# Patient Record
Sex: MALE | Race: AMERICAN INDIAN OR ALASKA NATIVE
[De-identification: names, ages, dates, MRNs, and addresses within clinical notes are randomized per-mention and may not be internally consistent; named-entity substitution may affect disease eponyms.]

---

## 2017-03-01 ENCOUNTER — HOSPITAL ENCOUNTER (EMERGENCY)
Dept: HOSPITAL 5 - ED | Age: 67
LOS: 1 days | Discharge: HOME | End: 2017-03-02
Payer: COMMERCIAL

## 2017-03-01 VITALS — SYSTOLIC BLOOD PRESSURE: 129 MMHG | DIASTOLIC BLOOD PRESSURE: 80 MMHG

## 2017-03-01 DIAGNOSIS — I25.2: ICD-10-CM
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
## 2017-03-01 DIAGNOSIS — I10: ICD-10-CM
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
## 2017-03-01 DIAGNOSIS — Z88.6: ICD-10-CM
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
## 2017-03-01 DIAGNOSIS — S39.012A: Primary | ICD-10-CM
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
## 2017-03-01 DIAGNOSIS — I50.9: ICD-10-CM

## 2017-03-01 DIAGNOSIS — M25.511: ICD-10-CM

## 2017-03-01 PROCEDURE — 72100 X-RAY EXAM L-S SPINE 2/3 VWS: CPT
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
## 2017-03-02 NOTE — XRAY REPORT
LUMBOSACRAL SPINE, 3 VIEWS:



History: Back pain



Findings: Normal bone mineralization. Normal height and alignment of 

the vertebral bodies. No compression deformity or bone lesion. There is 

mild to moderate diffuse facet arthropathy which is most pronounced at 

L4-5 and L5-S1. There is moderate disc space narrowing at L5-S1. The 

remaining disc levels are within normal limits.



Impression:

Lumbar spondylosis as described. No acute process noted.

## 2017-10-25 ENCOUNTER — HOSPITAL ENCOUNTER (EMERGENCY)
Dept: HOSPITAL 5 - ED | Age: 67
End: 2017-10-25
Payer: MEDICARE

## 2017-10-25 DIAGNOSIS — M79.606: Primary | ICD-10-CM

## 2017-10-25 DIAGNOSIS — Z53.21: ICD-10-CM

## 2018-02-27 ENCOUNTER — HOSPITAL ENCOUNTER (EMERGENCY)
Dept: HOSPITAL 5 - ED | Age: 68
LOS: 1 days | Discharge: LEFT BEFORE DISCHARGE | End: 2018-02-28
Payer: MEDICARE

## 2018-02-27 VITALS — SYSTOLIC BLOOD PRESSURE: 110 MMHG | DIASTOLIC BLOOD PRESSURE: 69 MMHG

## 2018-02-27 DIAGNOSIS — R06.02: ICD-10-CM

## 2018-02-27 DIAGNOSIS — M54.9: Primary | ICD-10-CM

## 2018-02-27 DIAGNOSIS — Z53.21: ICD-10-CM

## 2018-02-27 LAB
ALBUMIN SERPL-MCNC: 3.9 G/DL (ref 3.9–5)
ALT SERPL-CCNC: 30 UNITS/L (ref 7–56)
BASOPHILS # (AUTO): 0 K/MM3 (ref 0–0.1)
BASOPHILS NFR BLD AUTO: 0.8 % (ref 0–1.8)
BUN SERPL-MCNC: 17 MG/DL (ref 9–20)
BUN/CREAT SERPL: 15 %
CALCIUM SERPL-MCNC: 9.4 MG/DL (ref 8.4–10.2)
EOSINOPHIL # BLD AUTO: 0.1 K/MM3 (ref 0–0.4)
EOSINOPHIL NFR BLD AUTO: 2.7 % (ref 0–4.3)
HCT VFR BLD CALC: 38.1 % (ref 35.5–45.6)
HEMOLYSIS INDEX: 4
HGB BLD-MCNC: 12.8 GM/DL (ref 11.8–15.2)
LYMPHOCYTES # BLD AUTO: 2.5 K/MM3 (ref 1.2–5.4)
LYMPHOCYTES NFR BLD AUTO: 45.1 % (ref 13.4–35)
MCH RBC QN AUTO: 32 PG (ref 28–32)
MCHC RBC AUTO-ENTMCNC: 34 % (ref 32–34)
MCV RBC AUTO: 94 FL (ref 84–94)
MONOCYTES # (AUTO): 0.6 K/MM3 (ref 0–0.8)
MONOCYTES % (AUTO): 11 % (ref 0–7.3)
PLATELET # BLD: 168 K/MM3 (ref 140–440)
RBC # BLD AUTO: 4.05 M/MM3 (ref 3.65–5.03)

## 2018-02-27 PROCEDURE — 93010 ELECTROCARDIOGRAM REPORT: CPT

## 2018-02-27 PROCEDURE — 80053 COMPREHEN METABOLIC PANEL: CPT

## 2018-02-27 PROCEDURE — 93005 ELECTROCARDIOGRAM TRACING: CPT

## 2018-02-27 PROCEDURE — 85025 COMPLETE CBC W/AUTO DIFF WBC: CPT

## 2018-02-27 PROCEDURE — 83880 ASSAY OF NATRIURETIC PEPTIDE: CPT

## 2018-02-27 PROCEDURE — 36415 COLL VENOUS BLD VENIPUNCTURE: CPT

## 2018-02-27 PROCEDURE — 71046 X-RAY EXAM CHEST 2 VIEWS: CPT

## 2018-02-27 NOTE — XRAY REPORT
FINAL REPORT



EXAM:  XR CHEST ROUTINE 2V



HISTORY:  dyspnea 



TECHNIQUE:  PA and lateral views of the chest



PRIORS:  None.



FINDINGS:  

Lines, tubes, and devices: Single lead left subclavian pacemaker

terminates in the right ventricle.



Lungs and pleura: Trachea is normal in position.  Lungs are clear

of infiltrate, pleural effusion, vascular congestion, or

pneumothorax. 



Cardiomediastinal silhouette: Cardiac and mediastinal silhouettes

are unremarkable. Aorta is unfolded, likely age related.

Elevation of the left hemidiaphragm is noted. 



Other: Bony structures are intact.



IMPRESSION:  

No acute cardiopulmonary process seen.

## 2018-02-27 NOTE — EVENT NOTE
Date: 02/27/18





Patient is a 68-year-old male comes in with shortness of breath history of 

heart failure.  I will order troponin, blood work, chest xray and ekg and 

breathing treatment on him and give him oral Lasix.

## 2018-06-28 ENCOUNTER — HOSPITAL ENCOUNTER (EMERGENCY)
Dept: HOSPITAL 5 - ED | Age: 68
Discharge: LEFT BEFORE DISCHARGE | End: 2018-06-28
Payer: MEDICARE

## 2018-06-28 VITALS — DIASTOLIC BLOOD PRESSURE: 70 MMHG | SYSTOLIC BLOOD PRESSURE: 120 MMHG

## 2018-06-28 DIAGNOSIS — R07.9: ICD-10-CM

## 2018-06-28 DIAGNOSIS — Z53.21: ICD-10-CM

## 2018-06-28 DIAGNOSIS — R06.02: Primary | ICD-10-CM

## 2018-06-28 LAB
BUN SERPL-MCNC: 11 MG/DL (ref 9–20)
BUN/CREAT SERPL: 11 %
CALCIUM SERPL-MCNC: 8.7 MG/DL (ref 8.4–10.2)
HEMOLYSIS INDEX: 23

## 2018-06-28 PROCEDURE — 83880 ASSAY OF NATRIURETIC PEPTIDE: CPT

## 2018-06-28 PROCEDURE — 93010 ELECTROCARDIOGRAM REPORT: CPT

## 2018-06-28 PROCEDURE — 36415 COLL VENOUS BLD VENIPUNCTURE: CPT

## 2018-06-28 PROCEDURE — 93005 ELECTROCARDIOGRAM TRACING: CPT

## 2018-06-28 PROCEDURE — 80048 BASIC METABOLIC PNL TOTAL CA: CPT

## 2018-06-28 PROCEDURE — 84484 ASSAY OF TROPONIN QUANT: CPT

## 2019-02-04 ENCOUNTER — HOSPITAL ENCOUNTER (EMERGENCY)
Dept: HOSPITAL 5 - ED | Age: 69
End: 2019-02-04
Payer: MEDICARE

## 2019-02-04 DIAGNOSIS — Z79.82: ICD-10-CM

## 2019-02-04 DIAGNOSIS — J44.9: ICD-10-CM

## 2019-02-04 DIAGNOSIS — I46.9: Primary | ICD-10-CM

## 2019-02-04 DIAGNOSIS — E11.9: ICD-10-CM

## 2019-02-04 DIAGNOSIS — Z95.0: ICD-10-CM

## 2019-02-04 DIAGNOSIS — Z88.0: ICD-10-CM

## 2019-02-04 DIAGNOSIS — I11.0: ICD-10-CM

## 2019-02-04 DIAGNOSIS — Z88.5: ICD-10-CM

## 2019-02-04 PROCEDURE — 36569 INSJ PICC 5 YR+ W/O IMAGING: CPT

## 2019-02-04 PROCEDURE — 82962 GLUCOSE BLOOD TEST: CPT

## 2019-02-04 PROCEDURE — 92950 HEART/LUNG RESUSCITATION CPR: CPT

## 2019-02-04 PROCEDURE — 99285 EMERGENCY DEPT VISIT HI MDM: CPT

## 2019-02-04 NOTE — EMERGENCY DEPARTMENT REPORT
ED CPR HPI





- General


Chief Complaint: Cardiac Arrest/CPR


Stated Complaint: CARDIAC ARREST


Time Seen by Provider: 19 06:33


Source: patient


Mode of arrival: Stretcher


Limitations: Physical Limitation





- History of Present Illness


Initial Comments: 





68-year-old male with multiple medical problems presents also with shortness of 

breath.  Upon EMS arrival patient was in distress and was briefly placed on 

CPAP.  Patient's symptoms quickly deteriorated and he became apneic and 

pulseless.  Patient was intubated with a Jason airway.  He received 2 defibri

llator shocks for V. tach/v fib, EMS reports that patient's own AICD was also 

shocking the patient.  Patient presents with PEA and apnea


MD Complaint: stopped breathing


Place: home


Bystander CPR Performed: Yes


Number of Shocks Delivered: 2


Downtime Before ACLS Arrival (mins): 0


Initial Findings in the Field: alert


ROSC in the Field: No


Associated Injuries: No


Associated Symptoms: shortness of breath


Treatments Prior to Arrival: other airway device (jason tube), defribrillated 

shocks # (2), epinephrine mgs # (2)





- Related Data


                                Home Medications











 Medication  Instructions  Recorded  Confirmed  Last Taken


 


RX: Allopurinol [Zyloprim] 100 mg PO QDAY 18


 


RX: Aspirin [Adult Low Dose 81 mg PO QDAY 18





Aspirin EC]    


 


RX: Atorvastatin Calcium [Lipitor] 40 mg PO DAILY 18 Unknown


 


RX: Cholecalciferol Vit D3 1,000 unit PO QDAY 18 Unknown





[Vitamin D3]    


 


RX: Clopidogrel Bisulfate [Plavix] 75 mg PO QDAY 18








                                  Previous Rx's











 Medication  Instructions  Recorded  Last Taken  Type


 


RX: Amiodarone [Cordarone 200  mg PO BID #60 tablet 18 Rx





TAB]    


 


RX: Famotidine [Pepcid] 20 mg PO BID #60 tablet 10/25/18 Unknown Rx


 


RX: Torsemide [Demadex] 20 mg PO DAILY #30 tablet 18 Unknown Rx











                                    Allergies











Allergy/AdvReac Type Severity Reaction Status Date / Time


 


Penicillins Allergy  Shortness Verified 18 07:13





   of Breath  


 


simvastatin Allergy  Itching Verified 18 07:13


 


acetaminophen [From Tylenol] AdvReac  Unknown Verified 18 07:13


 


ibuprofen [From Motrin] AdvReac  Shortness Verified 18 07:13





   of Breath  














ED Review of Systems


ROS: 


Stated complaint: CARDIAC ARREST


Other details as noted in HPI





Comment: All other systems reviewed and negative





ED Past Medical Hx





- Past Medical History


Previous Medical History?: Yes


Hx Hypertension: Yes


Hx Heart Attack/AMI: Yes (S/P stents placement at [facility])


Hx Congestive Heart Failure: Yes


Hx Diabetes: Yes


Hx Asthma: No


Hx COPD: Yes


Hx HIV: No


Additional medical history: pacemaker, hep C-resolved, CPAP at home, Home 02 

user





- Surgical History


Past Surgical History?: Yes


Hx Pacemaker: Yes


Hx Internal Defibrillator: Yes


Additional Surgical History: testicle removed





- Social History


Smoking Status: Unknown if ever smoked





- Medications


Home Medications: 


                                Home Medications











 Medication  Instructions  Recorded  Confirmed  Last Taken  Type


 


RX: Allopurinol [Zyloprim] 100 mg PO QDAY 18 History


 


RX: Aspirin [Adult Low Dose 81 mg PO QDAY 18 History





Aspirin EC]     


 


RX: Atorvastatin Calcium [Lipitor] 40 mg PO DAILY 18 Unknown 

History


 


RX: Cholecalciferol Vit D3 1,000 unit PO QDAY 18 Unknown History





[Vitamin D3]     


 


RX: Clopidogrel Bisulfate [Plavix] 75 mg PO QDAY 18 

History


 


RX: Amiodarone [Cordarone 200  mg PO BID #60 tablet 18 Rx





TAB]     


 


RX: Famotidine [Pepcid] 20 mg PO BID #60 tablet 10/25/18 11/09/18 Unknown Rx


 


RX: Torsemide [Demadex] 20 mg PO DAILY #30 tablet 18  Unknown Rx














ED Physical Exam





- General


Limitations: Physical Limitation





- Other


Other exam information: 





General: Unresponsive


Head exam: Atraumatic, normocephalic


Eyes exam: Pupils fixed and dilated


ENT: Orally intubated with Jason airway


Neck exam: Normal inspection


Respiratory exam: Apneic, equal breath sounds with bagging


Cardiovascular: Pulseless


Abdomen: Soft, nondistended


Extremity: No deformity or spontaneous movement


Back: Normal Inspection


Neurologic: GCS equals 3


Skin: Warm, dry, intact





ED Course


                                   Vital Signs











  19





  06:06


 


Temperature 0 F L


 


Pulse Rate 0 L


 


Respiratory 0 L





Rate 


 


Blood Pressure 0/0


 


O2 Sat by Pulse 0 L





Oximetry 














- EJ/Peripheral Line


  ** Neck R


Time Out Performed: Yes


Indications: multiple IV sites needed


Skin Cleansed in Sterile Fashion: Yes


Size: 18


Dressing Placed: Tegaderm, tape


Patient Tolerated Procedure: well, no complications





ED Medical Decision Making





- Medical Decision Making





Patient was treated in the ED with continued ACLS protocol.  Remainder PEA is 

deteriorated to asystole despite receiving 2 additional epinephrine and sodium 

bicarbonate.  Accu-Chek was in normal range.  Unfortunately patient did not 

survive and his wife was informed of patient's death in the ED. Time of death 

5:48 am.





- Differential Diagnosis


COPD, CHF, PE, MI


Critical Care Time: Yes


Critical care time in (mins) excluding proc time.: 20


Critical care attestation.: 


If time is entered above; I have spent that time in minutes in the direct care 

of this critically ill patient, excluding procedure time.








ED Disposition


Clinical Impression: 


 Cardiopulmonary arrest





Disposition: DC-20 


Is pt being admited?: No


Condition: Stable


Time of Disposition: 06:40